# Patient Record
Sex: MALE | Race: WHITE | Employment: OTHER | URBAN - METROPOLITAN AREA
[De-identification: names, ages, dates, MRNs, and addresses within clinical notes are randomized per-mention and may not be internally consistent; named-entity substitution may affect disease eponyms.]

---

## 2023-06-22 ENCOUNTER — OFFICE VISIT (OUTPATIENT)
Dept: PRIMARY CARE | Facility: CLINIC | Age: 78
End: 2023-06-22
Payer: MEDICARE

## 2023-06-22 VITALS
DIASTOLIC BLOOD PRESSURE: 79 MMHG | OXYGEN SATURATION: 97 % | WEIGHT: 162 LBS | SYSTOLIC BLOOD PRESSURE: 133 MMHG | TEMPERATURE: 97.8 F | HEART RATE: 66 BPM | BODY MASS INDEX: 23.92 KG/M2

## 2023-06-22 DIAGNOSIS — I10 BENIGN ESSENTIAL HYPERTENSION: ICD-10-CM

## 2023-06-22 DIAGNOSIS — E78.5 HYPERLIPIDEMIA, UNSPECIFIED HYPERLIPIDEMIA TYPE: ICD-10-CM

## 2023-06-22 DIAGNOSIS — N40.0 BENIGN PROSTATIC HYPERPLASIA WITHOUT LOWER URINARY TRACT SYMPTOMS: ICD-10-CM

## 2023-06-22 DIAGNOSIS — Z00.00 MEDICARE ANNUAL WELLNESS VISIT, SUBSEQUENT: Primary | ICD-10-CM

## 2023-06-22 PROBLEM — E03.9 HYPOTHYROIDISM: Status: RESOLVED | Noted: 2020-07-27 | Resolved: 2023-06-22

## 2023-06-22 PROBLEM — N52.9 ERECTILE DYSFUNCTION: Status: ACTIVE | Noted: 2023-06-22

## 2023-06-22 PROCEDURE — 1160F RVW MEDS BY RX/DR IN RCRD: CPT | Performed by: STUDENT IN AN ORGANIZED HEALTH CARE EDUCATION/TRAINING PROGRAM

## 2023-06-22 PROCEDURE — 3075F SYST BP GE 130 - 139MM HG: CPT | Performed by: STUDENT IN AN ORGANIZED HEALTH CARE EDUCATION/TRAINING PROGRAM

## 2023-06-22 PROCEDURE — G0439 PPPS, SUBSEQ VISIT: HCPCS | Performed by: STUDENT IN AN ORGANIZED HEALTH CARE EDUCATION/TRAINING PROGRAM

## 2023-06-22 PROCEDURE — 1170F FXNL STATUS ASSESSED: CPT | Performed by: STUDENT IN AN ORGANIZED HEALTH CARE EDUCATION/TRAINING PROGRAM

## 2023-06-22 PROCEDURE — 1036F TOBACCO NON-USER: CPT | Performed by: STUDENT IN AN ORGANIZED HEALTH CARE EDUCATION/TRAINING PROGRAM

## 2023-06-22 PROCEDURE — 1159F MED LIST DOCD IN RCRD: CPT | Performed by: STUDENT IN AN ORGANIZED HEALTH CARE EDUCATION/TRAINING PROGRAM

## 2023-06-22 PROCEDURE — 99213 OFFICE O/P EST LOW 20 MIN: CPT | Performed by: STUDENT IN AN ORGANIZED HEALTH CARE EDUCATION/TRAINING PROGRAM

## 2023-06-22 PROCEDURE — 3078F DIAST BP <80 MM HG: CPT | Performed by: STUDENT IN AN ORGANIZED HEALTH CARE EDUCATION/TRAINING PROGRAM

## 2023-06-22 RX ORDER — LOSARTAN POTASSIUM 25 MG/1
25 TABLET ORAL DAILY
COMMUNITY

## 2023-06-22 RX ORDER — AMLODIPINE BESYLATE 5 MG/1
5 TABLET ORAL DAILY
COMMUNITY

## 2023-06-22 RX ORDER — ROSUVASTATIN CALCIUM 20 MG/1
20 TABLET, COATED ORAL NIGHTLY
COMMUNITY

## 2023-06-22 RX ORDER — AMOXICILLIN AND CLAVULANATE POTASSIUM 875; 125 MG/1; MG/1
1 TABLET, FILM COATED ORAL EVERY 12 HOURS
COMMUNITY
Start: 2022-08-08 | End: 2023-06-22 | Stop reason: WASHOUT

## 2023-06-22 ASSESSMENT — PATIENT HEALTH QUESTIONNAIRE - PHQ9
1. LITTLE INTEREST OR PLEASURE IN DOING THINGS: NOT AT ALL
2. FEELING DOWN, DEPRESSED OR HOPELESS: NOT AT ALL
SUM OF ALL RESPONSES TO PHQ9 QUESTIONS 1 AND 2: 0

## 2023-06-22 ASSESSMENT — ACTIVITIES OF DAILY LIVING (ADL)
TAKING_MEDICATION: INDEPENDENT
DRESSING: INDEPENDENT
GROCERY_SHOPPING: INDEPENDENT
MANAGING_FINANCES: INDEPENDENT
DOING_HOUSEWORK: INDEPENDENT
BATHING: INDEPENDENT

## 2023-06-22 NOTE — PATIENT INSTRUCTIONS
Your blood work is up to date; no need for additional draw at this appointment    Your medications are up to date today, I will renew them when a refill is due.     We reviewed all your lab work done in [place]    See me yearly for a complete physical exam, and sooner as needed for sick visits

## 2023-06-22 NOTE — PROGRESS NOTES
Subjective   Reason for Visit: Ebenezer Washington is an 78 y.o. male here for a Medicare Wellness visit.     Past Medical, Surgical, and Family History reviewed and updated in chart.    Reviewed all medications by prescribing practitioner or clinical pharmacist (such as prescriptions, OTCs, herbal therapies and supplements) and documented in the medical record.    HPI    Dr. Washington - urologist, removed the kidney   Dr. Pedersen - internist in [facility]     PMhx HTN (with white coat syndrome, controlled at home), renal oncocytoma s/p L nephrectomy, elevated PSA presenting for MWV.      Re: BPPV - resolved with exercises.     Re: HTN - well controlled. Stable on current regimen. n office and home numbers are great. Reports no sx high or low from HTN; denies blurry vision, HA, dizziness LoC CP SoB Viera and leg swelling. Well controlled lipids as well.      Re:  - BPH is stable, follows with urologist.     Re: HM- shots UTD. Blood work done in January in [place], scanned in. CS UTD, done at [facility] in [place]; due next 2025.  labs and studies were reviewed.         PMHx, FHx, Social Hx, Surg Hx personally reviewed at this appointment. I updated the surgical history and PMhx based on the interim findings I discussed above.     ROS: Denies wt gain/loss f/c HA LoC CP SOB NVDC. See HPI above, and scanned sheet (if applicable). All other systems are reviewed and are without complaint.        Patient Care Team:  Elton Ovalle MD as PCP - General     Review of Systems    Objective   Vitals:  /79   Pulse 66   Temp 36.6 °C (97.8 °F)   Wt 73.5 kg (162 lb)   SpO2 97%   BMI 23.92 kg/m²       Physical Exam  Gen: well developed in NAD. AAO x3.  HEENT: NC/AT. Anicteric sclera, symmetric pupils. MMM no thrush.  Neck: Soft, supple. No LAD. No goiter.   CV: RRR nl s1s2 no m/r/g  Pulm: CTAB no w/r/r, good air exchange  GI: soft NTND BS+ no hsm. Well healed scars from prior surgery.  Ext: WWP no edema  Neuro: II-XII grossly  intact, nonfocal systemic findings  MSK: 5/5 strength b/l UE and LE  Gait: unremarkable     Lab Results   Component Value Date    WBC 4.4 06/18/2020    HGB 15.7 06/18/2020    HCT 48.0 06/18/2020     06/18/2020    CHOL 150 06/18/2020    TRIG 70 06/18/2020    HDL 37.1 (A) 06/18/2020    ALT 21 06/18/2020    AST 18 06/18/2020     06/18/2020    K 4.5 06/18/2020     (H) 06/18/2020    CREATININE 1.65 (H) 06/18/2020    BUN 27 (H) 06/18/2020    CO2 27 06/18/2020    TSH 1.53 06/11/2018     par     UNMAPPED IMAGING RESULT - LUMENS  Ordered by an unspecified provider.         Assessment/Plan   # BPPV: resolved  - home exercises PRN     # HTN: at goal at home  - continue current regimen  - routine lab work if not recent  - continue lifestyle modifications      # hyperlipidemia: stable  - lipid panel, ASCVD+ score based on these values if age appropriate  - continue statin      #   1. ED  - viagra PRN     2. BPH  - f/u urology     # Health Maintenance  - routine blood work  - Colon Cancer Screening: UTD, repeat 2025  - PSA: UTD  - Immunizations: UTD   - AAA screening:  not indicated     Problem List Items Addressed This Visit       Benign essential hypertension    Overview     Last Assessment & Plan: Formatting of this note might be different from the original. Assessment: Managed with Cozaar. Ambrosiocoat HTN- /79 today. 110-120/70's at home.         BPH (benign prostatic hyperplasia)     Other Visit Diagnoses       Medicare annual wellness visit, subsequent    -  Primary

## 2023-07-19 DIAGNOSIS — I82.4Z9 DEEP VEIN THROMBOSIS (DVT) OF DISTAL VEIN OF LOWER EXTREMITY, UNSPECIFIED CHRONICITY, UNSPECIFIED LATERALITY (MULTI): Primary | ICD-10-CM

## 2023-07-19 NOTE — PROGRESS NOTES
Unprovoked DVT diagnosed this week by outside hospital provider. On eliquis now. Needs referral to hematologist.

## 2023-08-01 DIAGNOSIS — I82.4Z9 ACUTE DEEP VEIN THROMBOSIS (DVT) OF DISTAL VEIN OF LOWER EXTREMITY, UNSPECIFIED LATERALITY (MULTI): ICD-10-CM

## 2023-08-01 DIAGNOSIS — N40.0 BENIGN PROSTATIC HYPERPLASIA WITHOUT LOWER URINARY TRACT SYMPTOMS: Primary | ICD-10-CM

## 2023-08-01 NOTE — PROGRESS NOTES
Patient with mildly elevated PSA as part of his workup or his hypercoaguable state. His PSA is now between 6 and 9. He met with a [facility] urologist who wishes to get MRI prostate for further evaluation. Pt called in and is requesting this to be done at  as he thinks he can get this done faster at the new [facility] based on a few phone calls and internet research that he did. He is asking me to place this MRI prostate order.     Order placed. Informed patient this is likely self pay.

## 2024-06-25 ENCOUNTER — APPOINTMENT (OUTPATIENT)
Dept: PRIMARY CARE | Facility: CLINIC | Age: 79
End: 2024-06-25
Payer: MEDICARE

## 2024-06-25 VITALS
TEMPERATURE: 98 F | HEIGHT: 69 IN | HEART RATE: 64 BPM | WEIGHT: 164 LBS | BODY MASS INDEX: 24.29 KG/M2 | OXYGEN SATURATION: 95 % | DIASTOLIC BLOOD PRESSURE: 80 MMHG | SYSTOLIC BLOOD PRESSURE: 127 MMHG

## 2024-06-25 DIAGNOSIS — E78.2 MIXED HYPERLIPIDEMIA: ICD-10-CM

## 2024-06-25 DIAGNOSIS — Z00.00 MEDICARE ANNUAL WELLNESS VISIT, SUBSEQUENT: Primary | ICD-10-CM

## 2024-06-25 DIAGNOSIS — N18.32 STAGE 3B CHRONIC KIDNEY DISEASE (MULTI): ICD-10-CM

## 2024-06-25 DIAGNOSIS — I82.5Y2 CHRONIC DEEP VEIN THROMBOSIS (DVT) OF PROXIMAL VEIN OF LEFT LOWER EXTREMITY (MULTI): ICD-10-CM

## 2024-06-25 DIAGNOSIS — N40.0 BENIGN PROSTATIC HYPERPLASIA WITHOUT LOWER URINARY TRACT SYMPTOMS: ICD-10-CM

## 2024-06-25 DIAGNOSIS — I10 BENIGN ESSENTIAL HYPERTENSION: ICD-10-CM

## 2024-06-25 PROBLEM — I82.409 DEEP VEIN THROMBOSIS (DVT) OF LOWER EXTREMITY (MULTI): Status: ACTIVE | Noted: 2023-07-19

## 2024-06-25 PROBLEM — N18.30 STAGE 3 CHRONIC KIDNEY DISEASE (MULTI): Status: ACTIVE | Noted: 2020-11-22

## 2024-06-25 PROBLEM — N18.30 STAGE 3 CHRONIC KIDNEY DISEASE (MULTI): Status: RESOLVED | Noted: 2020-11-22 | Resolved: 2024-06-25

## 2024-06-25 PROCEDURE — 1126F AMNT PAIN NOTED NONE PRSNT: CPT | Performed by: STUDENT IN AN ORGANIZED HEALTH CARE EDUCATION/TRAINING PROGRAM

## 2024-06-25 PROCEDURE — 99214 OFFICE O/P EST MOD 30 MIN: CPT | Performed by: STUDENT IN AN ORGANIZED HEALTH CARE EDUCATION/TRAINING PROGRAM

## 2024-06-25 PROCEDURE — 3079F DIAST BP 80-89 MM HG: CPT | Performed by: STUDENT IN AN ORGANIZED HEALTH CARE EDUCATION/TRAINING PROGRAM

## 2024-06-25 PROCEDURE — 1123F ACP DISCUSS/DSCN MKR DOCD: CPT | Performed by: STUDENT IN AN ORGANIZED HEALTH CARE EDUCATION/TRAINING PROGRAM

## 2024-06-25 PROCEDURE — G0439 PPPS, SUBSEQ VISIT: HCPCS | Performed by: STUDENT IN AN ORGANIZED HEALTH CARE EDUCATION/TRAINING PROGRAM

## 2024-06-25 PROCEDURE — 1036F TOBACCO NON-USER: CPT | Performed by: STUDENT IN AN ORGANIZED HEALTH CARE EDUCATION/TRAINING PROGRAM

## 2024-06-25 PROCEDURE — 1158F ADVNC CARE PLAN TLK DOCD: CPT | Performed by: STUDENT IN AN ORGANIZED HEALTH CARE EDUCATION/TRAINING PROGRAM

## 2024-06-25 PROCEDURE — 1170F FXNL STATUS ASSESSED: CPT | Performed by: STUDENT IN AN ORGANIZED HEALTH CARE EDUCATION/TRAINING PROGRAM

## 2024-06-25 PROCEDURE — 3074F SYST BP LT 130 MM HG: CPT | Performed by: STUDENT IN AN ORGANIZED HEALTH CARE EDUCATION/TRAINING PROGRAM

## 2024-06-25 PROCEDURE — 1160F RVW MEDS BY RX/DR IN RCRD: CPT | Performed by: STUDENT IN AN ORGANIZED HEALTH CARE EDUCATION/TRAINING PROGRAM

## 2024-06-25 PROCEDURE — 1159F MED LIST DOCD IN RCRD: CPT | Performed by: STUDENT IN AN ORGANIZED HEALTH CARE EDUCATION/TRAINING PROGRAM

## 2024-06-25 RX ORDER — APIXABAN 2.5 MG/1
2.5 TABLET, FILM COATED ORAL 2 TIMES DAILY
COMMUNITY

## 2024-06-25 ASSESSMENT — ACTIVITIES OF DAILY LIVING (ADL)
DOING_HOUSEWORK: INDEPENDENT
BATHING: INDEPENDENT
MANAGING_FINANCES: INDEPENDENT
TAKING_MEDICATION: INDEPENDENT
GROCERY_SHOPPING: INDEPENDENT
DRESSING: INDEPENDENT

## 2024-06-25 ASSESSMENT — PAIN SCALES - GENERAL: PAINLEVEL: 0-NO PAIN

## 2024-06-25 NOTE — PROGRESS NOTES
"Subjective   Reason for Visit: Ebenezer Washington is an 79 y.o. male here for a Medicare Wellness visit.     Past Medical, Surgical, and Family History reviewed and updated in chart.    Reviewed all medications by prescribing practitioner or clinical pharmacist (such as prescriptions, OTCs, herbal therapies and supplements) and documented in the medical record.    HPI  Dr. Washington - urologist, removed the kidney   Dr. Pedersen - internist in [facility] [place] [facility]     PMhx HTN (with white coat syndrome, controlled at home), renal oncocytoma s/p L nephrectomy, elevated PSA presenting for MWV and follow up.    Since last in with me, diagnosed with an unprovoked DVT of LLE. He follows with a hematologist; he remains on renally dosed Eliquis with an unclear stop date, D-Dimers have been elevated. He had a full workup for elevated PSA as there was clinical suspicion for prostate cancer as the driving force behind his DVT, however biopsy was negative. He follows with urologists both in [place] and in [place].    Re: HTN / HLD - well controlled on statin and combo therapy, settles down into normal range once he's comfortable in the office. .Reports no sx high or low from HTN; denies blurry vision, HA, dizziness LoC CP SoB Viera and leg swelling     Re:  - BPH stable. Prostate biopsy without cancer.    Re: HM - shots UTD. CRS current, repeat 2025.  labs and studies reviewed in great detail.    PMHx, FHx, Social Hx, Surg Hx personally reviewed at this appointment. No pertinent findings and/or changes from prior (if applicable).    ROS: Denies wt gain/loss f/c HA LoC CP SOB NVDC. See HPI above, and scanned sheet (if applicable). All other systems are reviewed and are without complaint.         Patient Care Team:  Elton Ovalle MD as PCP - General (Internal Medicine)     Review of Systems    Objective   Vitals:  /80   Pulse 64   Temp 36.7 °C (98 °F)   Ht 1.753 m (5' 9\")   Wt 74.4 kg (164 lb)   SpO2 95%   BMI 24.22 kg/m²  "      Physical Exam  Gen: well developed in NAD. AAO x3.  HEENT: NC/AT. Anicteric sclera, symmetric pupils. MMM no thrush.  Neck: Soft, supple. No LAD. No goiter.   CV: RRR nl s1s2 no m/r/g  Pulm: CTAB no w/r/r, good air exchange  GI: soft NTND BS+ no hsm. Well healed scars from prior surgery.  Ext: WWP no edema  Neuro: II-XII grossly intact, nonfocal systemic findings  MSK: 5/5 strength b/l UE and LE  Gait: unremarkable  Skin: varicose veins LLE    Lab Results   Component Value Date    WBC 4.4 06/18/2020    HGB 15.7 06/18/2020    HCT 48.0 06/18/2020     06/18/2020    CHOL 150 06/18/2020    TRIG 70 06/18/2020    HDL 37.1 (A) 06/18/2020    ALT 21 06/18/2020    AST 18 06/18/2020     06/18/2020    K 4.5 06/18/2020     (H) 06/18/2020    CREATININE 1.65 (H) 06/18/2020    BUN 27 (H) 06/18/2020    CO2 27 06/18/2020    TSH 1.53 06/11/2018     par      Assessment/Plan     # HTN: at/near goal  - continue current regimen  - routine lab work if not recent  - continue lifestyle modifications    # hyperlipidemia: stable  - lipid panel, ASCVD+ score based on these values if age appropriate  - continue statin     # CKD: stage 3b, also s/p neprhectomy   - renally dose meds    # hx DVT  - continue low dose Eliquis (renally dosed)  - follow up heme/onc    # BPH: bx negative for cancer  - follow up urology    # Health Maintenance  - routine blood work  - Colon Cancer Screening: UTD, repeat 2025  - PSA: UTD  - Immunizations: UTD  - AAA screening:  not indicated     Problem List Items Addressed This Visit       RESOLVED: Stage 3 chronic kidney disease (Multi)    Overview     solitary R kidney

## 2024-06-25 NOTE — PATIENT INSTRUCTIONS
Your blood work is up to date; no need for additional draw at this appointment    Your medications are up to date today, I will renew them when a refill is due.     Follow up with your specialists as previously scheduled.     See me yearly for a Medicare Wellness Visit, and sooner as needed for sick visits

## 2025-07-01 ENCOUNTER — APPOINTMENT (OUTPATIENT)
Dept: PRIMARY CARE | Facility: CLINIC | Age: 80
End: 2025-07-01
Payer: MEDICARE

## 2025-07-01 VITALS
SYSTOLIC BLOOD PRESSURE: 146 MMHG | BODY MASS INDEX: 24.44 KG/M2 | DIASTOLIC BLOOD PRESSURE: 83 MMHG | OXYGEN SATURATION: 95 % | HEART RATE: 65 BPM | WEIGHT: 165 LBS | TEMPERATURE: 97.4 F | HEIGHT: 69 IN

## 2025-07-01 DIAGNOSIS — Z00.00 MEDICARE ANNUAL WELLNESS VISIT, SUBSEQUENT: Primary | ICD-10-CM

## 2025-07-01 DIAGNOSIS — I10 BENIGN ESSENTIAL HYPERTENSION: ICD-10-CM

## 2025-07-01 DIAGNOSIS — E78.2 MIXED HYPERLIPIDEMIA: ICD-10-CM

## 2025-07-01 DIAGNOSIS — I82.5Y2 CHRONIC DEEP VEIN THROMBOSIS (DVT) OF PROXIMAL VEIN OF LEFT LOWER EXTREMITY: ICD-10-CM

## 2025-07-01 DIAGNOSIS — N40.0 BENIGN PROSTATIC HYPERPLASIA WITHOUT LOWER URINARY TRACT SYMPTOMS: ICD-10-CM

## 2025-07-01 DIAGNOSIS — N18.32 STAGE 3B CHRONIC KIDNEY DISEASE (MULTI): ICD-10-CM

## 2025-07-01 PROCEDURE — 1123F ACP DISCUSS/DSCN MKR DOCD: CPT | Performed by: STUDENT IN AN ORGANIZED HEALTH CARE EDUCATION/TRAINING PROGRAM

## 2025-07-01 PROCEDURE — 3077F SYST BP >= 140 MM HG: CPT | Performed by: STUDENT IN AN ORGANIZED HEALTH CARE EDUCATION/TRAINING PROGRAM

## 2025-07-01 PROCEDURE — 1036F TOBACCO NON-USER: CPT | Performed by: STUDENT IN AN ORGANIZED HEALTH CARE EDUCATION/TRAINING PROGRAM

## 2025-07-01 PROCEDURE — 1159F MED LIST DOCD IN RCRD: CPT | Performed by: STUDENT IN AN ORGANIZED HEALTH CARE EDUCATION/TRAINING PROGRAM

## 2025-07-01 PROCEDURE — 99214 OFFICE O/P EST MOD 30 MIN: CPT | Performed by: STUDENT IN AN ORGANIZED HEALTH CARE EDUCATION/TRAINING PROGRAM

## 2025-07-01 PROCEDURE — 1158F ADVNC CARE PLAN TLK DOCD: CPT | Performed by: STUDENT IN AN ORGANIZED HEALTH CARE EDUCATION/TRAINING PROGRAM

## 2025-07-01 PROCEDURE — 1160F RVW MEDS BY RX/DR IN RCRD: CPT | Performed by: STUDENT IN AN ORGANIZED HEALTH CARE EDUCATION/TRAINING PROGRAM

## 2025-07-01 PROCEDURE — 1170F FXNL STATUS ASSESSED: CPT | Performed by: STUDENT IN AN ORGANIZED HEALTH CARE EDUCATION/TRAINING PROGRAM

## 2025-07-01 PROCEDURE — 3079F DIAST BP 80-89 MM HG: CPT | Performed by: STUDENT IN AN ORGANIZED HEALTH CARE EDUCATION/TRAINING PROGRAM

## 2025-07-01 PROCEDURE — 1126F AMNT PAIN NOTED NONE PRSNT: CPT | Performed by: STUDENT IN AN ORGANIZED HEALTH CARE EDUCATION/TRAINING PROGRAM

## 2025-07-01 PROCEDURE — G0439 PPPS, SUBSEQ VISIT: HCPCS | Performed by: STUDENT IN AN ORGANIZED HEALTH CARE EDUCATION/TRAINING PROGRAM

## 2025-07-01 ASSESSMENT — ACTIVITIES OF DAILY LIVING (ADL)
TAKING_MEDICATION: INDEPENDENT
DOING_HOUSEWORK: INDEPENDENT
GROCERY_SHOPPING: INDEPENDENT
BATHING: INDEPENDENT
MANAGING_FINANCES: INDEPENDENT
DRESSING: INDEPENDENT

## 2025-07-01 ASSESSMENT — PATIENT HEALTH QUESTIONNAIRE - PHQ9
SUM OF ALL RESPONSES TO PHQ9 QUESTIONS 1 AND 2: 0
1. LITTLE INTEREST OR PLEASURE IN DOING THINGS: NOT AT ALL
2. FEELING DOWN, DEPRESSED OR HOPELESS: NOT AT ALL

## 2025-07-01 ASSESSMENT — PAIN SCALES - GENERAL: PAINLEVEL_OUTOF10: 0-NO PAIN

## 2025-07-01 NOTE — PROGRESS NOTES
Subjective   Reason for Visit: Ebenezer Washington is an80 y.o. male who presents for a Medicare Wellness visit.    Past Medical, Surgical, and Family History reviewed and updated in chart.    Reviewed all medications by prescribing practitioner or clinical pharmacist (such as prescriptions, OTCs, herbal therapies and supplements) and documented in the medical record.    History of Present Illness  Dr. Washington - urologist, removed the kidney   Dr. Pederesn - internist in [facility]     PMhx HTN (with white coat syndrome, controlled at home), renal oncocytoma s/p L nephrectomy, elevated PSA presenting for MWV and follow up.    He reports no significant health issues since his last visit, except for an emergency room visit on 03/08/2025 due to severe itching in his arms. The itching began on a Friday, and by Saturday, his right arm had swollen to the point where he could not bend it to eat. He was diagnosed with rhabdomyolysis, with a creatine kinase level of 2000. He attributes this to overexertion at the gym, where he works out every other day. He also mentions a bug bite on his arm around the same time. He reduced his gym activity by about 20% for the next 2 to 3 months. He was treated with fluids and Benadryl in the ER and did not experience any urinary issues at that time.    He underwent a colonoscopy recently, which revealed a tubular adenoma. A repeat colonoscopy is scheduled in a few years.    His PSA level was 11.68 yesterday, down from 12 six weeks ago. He had a biopsy in 2023 due to a high PSA level and an MRI suggestive of cancer, but the biopsy results were negative. He had a Zoom call with Dr. Washington last week, who advised him to repeat the PSA test and possibly another MRI for comparison with the previous one.    He is currently on Eliquis 2.5 mg twice daily for hx of unproved DVT and reports no issues with it. He has not experienced any repeat clots, nosebleeds, urinary bleeding, or rectal bleeding. He has not  "fallen or injured himself while biking or at the gym.    He is also on medication for blood pressure and cholesterol management. He monitors his blood pressure at home a few times a month using a wrist cuff.    He reports no feelings of depression, hopelessness, or cognitive issues. He maintains an active lifestyle, walking 6 to 7 miles a day, going to the gym every other day, and riding a bike every other day. He is independent in all activities of daily living and does not use any assistive devices. He feels safe at home and rates his health as excellent. He has not experienced any falls in the past year.    PAST SURGICAL HISTORY:  - Renal oncocytoma removal  - Biopsy in 2023    SOCIAL HISTORY  He is a non-smoker. He has very low risk alcohol use and no illicit drug use.      PMHx, FHx, Social Hx, Surg Hx personally reviewed at this appointment. No pertinent findings and/or changes from prior (if applicable).    ROS: Denies wt gain/loss f/c HA LoC CP SOB NVDC. See HPI above, and scanned sheet (if applicable). All other systems are reviewed and are without complaint.       Objective     /83   Pulse 65   Temp 36.3 °C (97.4 °F)   Ht 1.753 m (5' 9\")   Wt 74.8 kg (165 lb)   SpO2 95%   BMI 24.37 kg/m²      Physical Exam  Gen: well developed in NAD. AAO x3.  HEENT: NC/AT. Anicteric sclera, symmetric pupils. MMM no thrush.  Neck: Soft, supple. No LAD. No goiter.   CV: RRR nl s1s2 no m/r/g  Pulm: CTAB no w/r/r, good air exchange  GI: soft NTND BS+ no hsm. Well healed scars from prior surgery.  Ext: WWP no edema  Neuro: II-XII grossly intact, nonfocal systemic findings  MSK: 5/5 strength b/l UE and LE  Gait: unremarkable  Skin: varicose veins LLE            Lab Results   Component Value Date    WBC 4.4 06/18/2020    HGB 15.7 06/18/2020    HCT 48.0 06/18/2020     06/18/2020    CHOL 150 06/18/2020    TRIG 70 06/18/2020    HDL 37.1 (A) 06/18/2020    ALT 21 06/18/2020    AST 18 06/18/2020     06/18/2020 "    K 4.5 06/18/2020     (H) 06/18/2020    CREATININE 1.65 (H) 06/18/2020    BUN 27 (H) 06/18/2020    CO2 27 06/18/2020    TSH 1.53 06/11/2018     par     UNMAPPED IMAGING RESULT - LUMENS  Ordered by an unspecified provider.        Current Outpatient Medications   Medication Instructions    amLODIPine (NORVASC) 5 mg, oral, Daily    Eliquis 2.5 mg, oral, 2 times daily    losartan (COZAAR) 25 mg, oral, Daily, as directed    rosuvastatin (CRESTOR) 20 mg, oral, Nightly          Assessment & Plan  1. Medicare wellness visit: Stable.  - Health has remained stable over the past year. Recent colonoscopy revealed polyps, which are currently stable. Follow-up colonoscopy scheduled in a few years.  - Experienced cellulitic process following a bug bite and mild rhabdomyolysis after gym session. Blood work conducted, received fluids, returned to normal state.  - PSA levels monitored twice consecutively, trending downwards but remain elevated compared to average for age group. Continued surveillance planned.  - Immunizations up-to-date.  - Currently on Eliquis 2.5 mg twice daily.    2. Rhabdomyolysis: Resolved.  - CK level of 2000 on 03/08/2024, likely due to overexertion at gym and bug bite.  - Treated with fluids and Benadryl for irritation from bug bite.  - Monitoring for any recurrence or complications.    3. Elevated PSA: Stable.  - PSA levels decreased from 12 to 11.68.  - Continued monitoring planned.  - Another MRI may be considered to compare with previous one. Biopsy in 2023 was negative for cancer.  - Surveillance plan includes regular PSA tests and potential follow-up imaging.    4. Hypertension: Stable.  - Blood pressure well-controlled today, systolic readings in the 120s.  - Advised to continue current blood pressure medication regimen.  - Monitoring blood pressure at home regularly.    5. Hyperlipidemia: Stable.  - Currently on cholesterol-lowering medication.  - Continued adherence to medication  recommended.  - Regular monitoring of cholesterol levels advised.    6. Medication management.  - On Eliquis 2.5 mg twice daily. No issues such as repeat clots, nosebleeds, urinary bleeding, or bleeding at the bottom reported.  - Will continue this medication.  - Monitoring for any adverse effects or need for dosage adjustment.    Follow-up  - Follow-up with specialists when returning to [place] in mid-October.    Assessment & Plan  Stage 3b chronic kidney disease (Multi)         Medicare annual wellness visit, subsequent         Benign prostatic hyperplasia without lower urinary tract symptoms         Benign essential hypertension         Mixed hyperlipidemia         Chronic deep vein thrombosis (DVT) of proximal vein of left lower extremity              Elton Ovalle MD            This medical note was created with the assistance of artificial intelligence (AI) for documentation purposes. The content has been reviewed and confirmed by the healthcare provider for accuracy and completeness. Patient consented to the use of audio recording and use of AI during their visit.

## 2026-07-02 ENCOUNTER — APPOINTMENT (OUTPATIENT)
Dept: PRIMARY CARE | Facility: CLINIC | Age: 81
End: 2026-07-02
Payer: MEDICARE